# Patient Record
Sex: MALE | Race: WHITE | NOT HISPANIC OR LATINO | URBAN - METROPOLITAN AREA
[De-identification: names, ages, dates, MRNs, and addresses within clinical notes are randomized per-mention and may not be internally consistent; named-entity substitution may affect disease eponyms.]

---

## 2020-09-15 ENCOUNTER — TELEPHONE (OUTPATIENT)
Dept: FAMILY MEDICINE CLINIC | Facility: CLINIC | Age: 60
End: 2020-09-15

## 2020-09-15 NOTE — TELEPHONE ENCOUNTER
Patient called to see if you would consider taking him and his family, Ananya 09/27/1963 and son Robbie 03/16/2000 as new patients.  He was seeing Dr Raphael Reddy, but he is now retiring.  Tim was a patient of yours at the practice in [place].  Thanks

## 2021-01-06 ENCOUNTER — TELEPHONE (OUTPATIENT)
Dept: FAMILY MEDICINE CLINIC | Facility: CLINIC | Age: 61
End: 2021-01-06

## 2021-01-06 NOTE — TELEPHONE ENCOUNTER
Pt is a new patient here in this office, will be seeing  tomorrow. Will wait for fax to come in for PA

## 2021-01-06 NOTE — TELEPHONE ENCOUNTER
ISAIAH FROM [facility] CALLED LEONEL ND STATED HE IS FAXING A PRIOR AUTHORIZATION OVER BY FAX     SOFTWARE COVER MY MEDS    CODE [id number]     CALL IN INFORMATION TO [facility] CLINICAL CALL REVIEW   [phone]   PROVIDE INFORMATION BYSTOLIC 5MG 30 DAY SUPPLY 30 PILLS     REFERENCE [id number]

## 2021-01-07 ENCOUNTER — OFFICE VISIT (OUTPATIENT)
Dept: FAMILY MEDICINE CLINIC | Facility: CLINIC | Age: 61
End: 2021-01-07

## 2021-01-07 VITALS
BODY MASS INDEX: 26.07 KG/M2 | HEIGHT: 68 IN | OXYGEN SATURATION: 98 % | HEART RATE: 74 BPM | SYSTOLIC BLOOD PRESSURE: 134 MMHG | WEIGHT: 172 LBS | DIASTOLIC BLOOD PRESSURE: 80 MMHG

## 2021-01-07 DIAGNOSIS — I10 ESSENTIAL HYPERTENSION: Primary | ICD-10-CM

## 2021-01-07 DIAGNOSIS — Z12.5 SCREENING FOR PROSTATE CANCER: ICD-10-CM

## 2021-01-07 PROBLEM — Z00.00 ROUTINE ADULT HEALTH MAINTENANCE: Status: ACTIVE | Noted: 2021-01-07

## 2021-01-07 PROBLEM — K51.00 ULCERATIVE PANCOLITIS WITHOUT COMPLICATION: Status: ACTIVE | Noted: 2021-01-07

## 2021-01-07 PROCEDURE — 99203 OFFICE O/P NEW LOW 30 MIN: CPT | Performed by: FAMILY MEDICINE

## 2021-01-07 RX ORDER — PANTOPRAZOLE SODIUM 20 MG/1
TABLET, DELAYED RELEASE ORAL
COMMUNITY
Start: 2020-10-03

## 2021-01-07 RX ORDER — MULTIPLE VITAMINS W/ MINERALS TAB 9MG-400MCG
1 TAB ORAL DAILY
COMMUNITY
End: 2022-05-20

## 2021-01-07 RX ORDER — NEBIVOLOL HYDROCHLORIDE 5 MG/1
5 TABLET ORAL
COMMUNITY
Start: 2021-01-02 | End: 2021-01-27 | Stop reason: SDUPTHER

## 2021-01-07 NOTE — PROGRESS NOTES
"  Chief Complaint   Patient presents with   • Hypertension   • Establish Care     Patient called to see if you would consider taking him and his family, Ananya 09/27/1963 and son Robbie 03/16/2000 as new patients.  He was seeing Dr Raphael Reddy, but he is now retiring.  Tim was a patient of yours at the practice in [place].  Thanks  Subjective     Patient here for follow-up of elevated blood pressure.    He is exercising and is adherent to a low-salt diet.    Blood pressure is well controlled at home.   Cardiac symptoms: none.   Patient denies: chest pressure/discomfort, claudication, cough, dyspnea, exertional chest pressure/discomfort, fatigue, irregular heart beat, lower extremity edema, near-syncope, orthopnea, palpitations, paroxysmal nocturnal dyspnea, syncope, tachypnea and weight gain.   Cardiovascular risk factors: hypertension and male gender.   Use of agents associated with hypertension: none.   History of target organ damage: none.  Patient is taking prescribed hypertension medications as prescribed without side effects.  Had failed prior beta blockers. But Bystolic working well for year.        The following portions of the patient's history were reviewed and updated as appropriate: allergies, current medications, past family history, past medical history, past social history, past surgical history and problem list.    Review of Systems  Pertinent items are noted in HPI.    No results found for this or any previous visit.     Vitals:    01/07/21 0958   BP: 134/80   BP Location: Left arm   Patient Position: Sitting   Cuff Size: Adult   Pulse: 74   SpO2: 98%   Weight: 78 kg (172 lb)   Height: 172.7 cm (68\")     BP Readings from Last 3 Encounters:   01/07/21 134/80     Objective      Gen: alert, pleasant.  Neck: no bruit, no enlarged thyroid  Lungs: CTA  Heart: RR, no murmur  Feet: no edema  Pulses: intact  Mood: stable     Assessment/Plan   Hypertension, normal blood pressure Evidence of target " organ damage: none.    Diagnoses and all orders for this visit:    1. Essential hypertension (Primary)  -     CBC & Differential; Future  -     Comprehensive Metabolic Panel; Future  -     Lipid Panel; Future  -     PSA Screen; Future  -     TSH; Future  -     Urinalysis With Microscopic If Indicated (No Culture) - Urine, Clean Catch; Future    2. Screening for prostate cancer  -     PSA Screen; Future    UC is stable.    Medication: no change.  Follow up: 5 months and as needed.    There are no Patient Instructions on file for this visit.  There are no discontinued medications.     Return in about 4 months (around 5/7/2021) for Annual physical, blood pressure.    Limit salt  Limit alcoholic drinks to 1 a day  Limit caffeine to 1-2 servings a day    Dr. Reid Davenport MD  [place], [place]  [facility].

## 2021-01-27 ENCOUNTER — TELEPHONE (OUTPATIENT)
Dept: FAMILY MEDICINE CLINIC | Facility: CLINIC | Age: 61
End: 2021-01-27

## 2021-01-27 DIAGNOSIS — I10 ESSENTIAL HYPERTENSION: Primary | ICD-10-CM

## 2021-01-27 RX ORDER — NEBIVOLOL HYDROCHLORIDE 5 MG/1
5 TABLET ORAL
Qty: 30 TABLET | Refills: 11 | Status: SHIPPED | OUTPATIENT
Start: 2021-01-27 | End: 2022-01-11 | Stop reason: SDUPTHER

## 2021-01-27 NOTE — TELEPHONE ENCOUNTER
Caller: Tim iN    Relationship: Self    Best call back number: [phone]    Medication needed:   Requested Prescriptions     Pending Prescriptions Disp Refills   • Bystolic 5 MG tablet 30 tablet      Sig: Take 1 tablet by mouth every night at bedtime.       When do you need the refill by: 02/01/21    What details did the patient provide when requesting the medication: THE PATIENT STATED THAT HE HAD A PRIOR AUTHORIZATION FORM FOR THIS MEDICATION SENT OVER FOR THE MEDICATION FROM [facility] TO THE OFFICE AND WOULD LIKE TO CHECK IF THAT FORM WAS FAXED BACK TO [facility]     Does the patient have less than a 3 day supply:  [] Yes  [x] No    What is the patient's preferred pharmacy: [facility] #[id number] [place] [address] [address] AT [address] [phone] [facility] [phone] FX

## 2021-01-27 NOTE — TELEPHONE ENCOUNTER
Medication has been approved, pt notified. Needing new script called in to pharmacy.     Ok thanks    done

## 2021-05-06 DIAGNOSIS — R53.83 FATIGUE, UNSPECIFIED TYPE: Primary | ICD-10-CM

## 2021-05-07 DIAGNOSIS — R53.83 FATIGUE, UNSPECIFIED TYPE: ICD-10-CM

## 2021-05-14 ENCOUNTER — OFFICE VISIT (OUTPATIENT)
Dept: FAMILY MEDICINE CLINIC | Facility: CLINIC | Age: 61
End: 2021-05-14

## 2021-05-14 VITALS
HEIGHT: 68 IN | SYSTOLIC BLOOD PRESSURE: 120 MMHG | HEART RATE: 81 BPM | TEMPERATURE: 97.3 F | BODY MASS INDEX: 26.67 KG/M2 | OXYGEN SATURATION: 99 % | DIASTOLIC BLOOD PRESSURE: 78 MMHG | WEIGHT: 176 LBS

## 2021-05-14 DIAGNOSIS — I10 ESSENTIAL HYPERTENSION: ICD-10-CM

## 2021-05-14 DIAGNOSIS — Z12.5 SCREENING FOR PROSTATE CANCER: ICD-10-CM

## 2021-05-14 DIAGNOSIS — K51.00 ULCERATIVE PANCOLITIS WITHOUT COMPLICATION (HCC): ICD-10-CM

## 2021-05-14 DIAGNOSIS — Z00.00 ROUTINE ADULT HEALTH MAINTENANCE: Primary | ICD-10-CM

## 2021-05-14 DIAGNOSIS — R79.89 ELEVATED TSH: ICD-10-CM

## 2021-05-14 PROCEDURE — 99396 PREV VISIT EST AGE 40-64: CPT | Performed by: FAMILY MEDICINE

## 2021-05-14 RX ORDER — ZOSTER VACCINE RECOMBINANT, ADJUVANTED 50 MCG/0.5
50 KIT INTRAMUSCULAR
Qty: 1 EACH | Refills: 1 | Status: SHIPPED | OUTPATIENT
Start: 2021-05-14 | End: 2021-11-11

## 2021-05-14 NOTE — PROGRESS NOTES
"  Chief Complaint   Patient presents with   • Annual Exam       Subjective   Tim Ni is a 60 y.o. male and is here for a yearly physical exam. The patient reports problems - recent flair of his UC.    Do you take any herbs or supplements that were not prescribed by a doctor? yes. If so, these will be added to active medication list.    The following portions of the patient's history were reviewed and updated as appropriate: allergies, current medications, past family history, past medical history, past social history, past surgical history and problem list.    Social and Family and Surgical History reviewed and updated today, see Rooming tab.    Health History, Preventive Measures and Vaccination flow sheets reviewed and updated today.    Patient's current medical chart in Epic; including previous office notes, Mychart messages, recent phone calls, imaging, labs, specialist's evaluation either in notes or in Media tab reviewed today.    Other outside pertinent medical information also reviewed thru Care Everywhere function is also reviewed today.    Review of Systems  Review of Systems  A comprehensive review of systems was negative except for: Gastrointestinal: positive for constipation and diarrhea    Vitals:    05/14/21 1023   BP: 120/78   BP Location: Left arm   Patient Position: Sitting   Cuff Size: Adult   Pulse: 81   Temp: 97.3 °F (36.3 °C)   TempSrc: Temporal   SpO2: 99%   Weight: 79.8 kg (176 lb)   Height: 172.7 cm (68\")       General Appearance:  Alert, cooperative, no distress, appears stated age   Head:  Normocephalic, without obvious abnormality, atraumatic   Eyes:  PERRL, conjunctiva/corneas clear, EOM's intact.   Ears:  Normal TM's and external ear canals, both ears   Nose: Nares normal, septum midline, mucosa normal, no drainage or sinus tenderness   Throat: Lips, mucosa, and tongue normal; teeth and gums normal   Neck: Supple, symmetrical, trachea midline, no adenopathy;   thyroid: No " enlargement/tenderness/nodules; no carotid  bruit   Back:  Symmetric, no curvature, ROM normal, no CVA tenderness   Lungs:  Clear to auscultation bilaterally, respirations unlabored   Chest wall:  No tenderness or deformity   Heart:  Regular rate and rhythm, S1 and S2 normal, no murmur, rub or gallop   Abdomen:  Soft, non-tender, bowel sounds active all four quadrants,   no masses, no organomegaly   Rectal:        Extremities: Extremities normal, atraumatic, no cyanosis or edema   Pulses: 2+ and symmetric all extremities   Skin: Skin color, texture, turgor normal, no rashes or lesions   Lymph nodes: Cervical, supraclavicular, and axillary nodes normal   Neurologic: CNII-XII intact. Normal strength, sensation and reflexes   throughout          Results for orders placed or performed in visit on 05/07/21   Comprehensive Metabolic Panel    Specimen: Blood   Result Value Ref Range    Glucose 97 65 - 99 mg/dL    BUN 16 8 - 23 mg/dL    Creatinine 1.21 0.76 - 1.27 mg/dL    eGFR Non African Am 61 >60 mL/min/1.73    eGFR African Am 74 >60 mL/min/1.73    BUN/Creatinine Ratio 13.2 7.0 - 25.0    Sodium 140 136 - 145 mmol/L    Potassium 4.5 3.5 - 5.2 mmol/L    Chloride 101 98 - 107 mmol/L    Total CO2 30.9 (H) 22.0 - 29.0 mmol/L    Calcium 9.4 8.6 - 10.5 mg/dL    Total Protein 7.4 6.0 - 8.5 g/dL    Albumin 4.40 3.50 - 5.20 g/dL    Globulin 3.0 gm/dL    A/G Ratio 1.5 g/dL    Total Bilirubin 0.5 0.0 - 1.2 mg/dL    Alkaline Phosphatase 103 39 - 117 U/L    AST (SGOT) 21 1 - 40 U/L    ALT (SGPT) 18 1 - 41 U/L   Lipid Panel    Specimen: Blood   Result Value Ref Range    Total Cholesterol 172 0 - 200 mg/dL    Triglycerides 100 0 - 150 mg/dL    HDL Cholesterol 42 40 - 60 mg/dL    VLDL Cholesterol Jose 18 5 - 40 mg/dL    LDL Chol Calc (NIH) 112 (H) 0 - 100 mg/dL   PSA Screen    Specimen: Blood   Result Value Ref Range    PSA 1.340 0.000 - 4.000 ng/mL   TSH    Specimen: Blood   Result Value Ref Range    TSH 5.920 (H) 0.270 - 4.200 uIU/mL    Urinalysis With Microscopic If Indicated (No Culture) - Urine, Clean Catch    Specimen: Urine, Clean Catch   Result Value Ref Range    Specific Gravity, UA Comment 1.005 - 1.030    pH, UA 5.5 5.0 - 8.0    Color, UA Yellow     Appearance, UA Clear Clear    Leukocytes, UA Negative Negative    Protein Negative Negative    Glucose, UA Negative Negative    Ketones Negative Negative    Blood, UA Negative Negative    Bilirubin, UA Negative Negative    Urobilinogen, UA Comment     Nitrite, UA Negative Negative   Vitamin B12   Result Value Ref Range    Vitamin B-12 740 211 - 946 pg/mL   CBC & Differential    Specimen: Blood   Result Value Ref Range    WBC 7.14 3.40 - 10.80 10*3/mm3    RBC 4.69 4.14 - 5.80 10*6/mm3    Hemoglobin 13.7 13.0 - 17.7 g/dL    Hematocrit 40.3 37.5 - 51.0 %    MCV 85.9 79.0 - 97.0 fL    MCH 29.2 26.6 - 33.0 pg    MCHC 34.0 31.5 - 35.7 g/dL    RDW 12.9 12.3 - 15.4 %    Platelets 255 140 - 450 10*3/mm3    Neutrophil Rel % 68.0 42.7 - 76.0 %    Lymphocyte Rel % 20.9 19.6 - 45.3 %    Monocyte Rel % 7.1 5.0 - 12.0 %    Eosinophil Rel % 2.1 0.3 - 6.2 %    Basophil Rel % 1.5 0.0 - 1.5 %    Neutrophils Absolute 4.85 1.70 - 7.00 10*3/mm3    Lymphocytes Absolute 1.49 0.70 - 3.10 10*3/mm3    Monocytes Absolute 0.51 0.10 - 0.90 10*3/mm3    Eosinophils Absolute 0.15 0.00 - 0.40 10*3/mm3    Basophils Absolute 0.11 0.00 - 0.20 10*3/mm3    Immature Granulocyte Rel % 0.4 0.0 - 0.5 %    Immature Grans Absolute 0.03 0.00 - 0.05 10*3/mm3    nRBC 0.0 0.0 - 0.2 /100 WBC     Assessment/Plan   Healthy male exam.  Diagnoses and all orders for this visit:    1. Routine adult health maintenance (Primary)  -     CBC & Differential; Future  -     Comprehensive Metabolic Panel; Future  -     Lipid Panel; Future  -     TSH; Future  -     T4, Free; Future  -     T3; Future  -     Urinalysis With Microscopic If Indicated (No Culture) - Urine, Clean Catch; Future  -     PSA Screen; Future    2. Essential hypertension  -     CBC &  Differential; Future  -     Comprehensive Metabolic Panel; Future  -     Lipid Panel; Future  -     Urinalysis With Microscopic If Indicated (No Culture) - Urine, Clean Catch; Future    3. Ulcerative pancolitis without complication (CMS/HCC)    4. Screening for prostate cancer  -     PSA Screen; Future    5. Elevated TSH  -     TSH; Future  -     T4, Free; Future  -     T3; Future    Other orders  -     Zoster Vac Recomb Adjuvanted (Shingrix) 50 MCG/0.5ML reconstituted suspension; Inject 0.5 mL into the appropriate muscle as directed by prescriber Every 6 (Six) Months for 2 doses.  Dispense: 1 each; Refill: 1      1. Labs reviewed  TSH up some  2. Patient Counseling:  --Nutrition: Stressed importance of moderation in sodium/caffeine intake, saturated fat and cholesterol.  Discussed caloric balance, sufficient intake of fresh fruits, vegetables, fiber, calcium, iron.  --Exercise: Stressed the importance of regular exercise.   --Substance Abuse: Discussed cessation/primary prevention of tobacco, alcohol, or other drug use; driving or other dangerous activities under the influence.    --Dental health: Discussed importance of regular tooth brushing, flossing, and dental visits.  --Suggested having eyes and vision checked if needed or past due.  --Immunizations reviewed and given if needed.  --Discussed benefits of screening colonoscopy and or ColoGuard.  3. Discussed the patient's BMI with him.  The BMI is above average; BMI management plan is completed  4. Follow up in one year    There are no Patient Instructions on file for this visit.    There are no discontinued medications.     Dr. Reid Davenport MD  [place], [place]  [facility]

## 2021-10-06 ENCOUNTER — TELEPHONE (OUTPATIENT)
Dept: FAMILY MEDICINE CLINIC | Facility: CLINIC | Age: 61
End: 2021-10-06

## 2021-10-06 NOTE — TELEPHONE ENCOUNTER
PATIENT CALLED FOR A 90 DAY REFILL ON HIS NEXT REFILL OF    Bystolic 5 MG tablet    HE WOULD LIKE TO HAVE 90 DAY SUPPLY AT NEXT REFILL. HE JUST PICKED UP THIS MONTHS REFILL    [facility] #[id number] - [place], [place] - [address] AT [facility] OF [place] [address] [address] - [phone]  - [phone]   [phone]    CALL BACK NUMBER [phone]

## 2022-01-11 DIAGNOSIS — I10 ESSENTIAL HYPERTENSION: ICD-10-CM

## 2022-01-11 RX ORDER — NEBIVOLOL HYDROCHLORIDE 5 MG/1
5 TABLET ORAL
Qty: 30 TABLET | Refills: 11 | Status: SHIPPED | OUTPATIENT
Start: 2022-01-11 | End: 2022-12-26

## 2022-01-11 NOTE — TELEPHONE ENCOUNTER
Caller: Tim Ni    Relationship: Self    Best call back number: [phone]    Requested Prescriptions:   Requested Prescriptions     Pending Prescriptions Disp Refills   • Bystolic 5 MG tablet 30 tablet 11     Sig: Take 1 tablet by mouth every night at bedtime.        Pharmacy where request should be sent:  [facility] #[id number] [address] AT [address] [phone] [facility] [phone]         Additional details provided by patient: PATIENT IS STATING HIS INSURANCE IS REQUIRING A PRIOR AUTHORIZATION ON THIS MEDICATION AGAIN. PLEASE APPROVE AND REFILL. PATIENT CAN NOT TAKE THE GENERIC.     Does the patient have less than a 3 day supply:  [x] Yes  [] No    Rosana Son, Vel Rep   01/11/22 10:37 EST

## 2022-05-20 ENCOUNTER — OFFICE VISIT (OUTPATIENT)
Dept: FAMILY MEDICINE CLINIC | Facility: CLINIC | Age: 62
End: 2022-05-20

## 2022-05-20 VITALS
SYSTOLIC BLOOD PRESSURE: 130 MMHG | BODY MASS INDEX: 26.37 KG/M2 | HEART RATE: 73 BPM | OXYGEN SATURATION: 95 % | TEMPERATURE: 97.7 F | HEIGHT: 68 IN | WEIGHT: 174 LBS | DIASTOLIC BLOOD PRESSURE: 80 MMHG

## 2022-05-20 DIAGNOSIS — Z00.00 ROUTINE ADULT HEALTH MAINTENANCE: Primary | ICD-10-CM

## 2022-05-20 DIAGNOSIS — I10 ESSENTIAL HYPERTENSION: ICD-10-CM

## 2022-05-20 DIAGNOSIS — E03.9 ACQUIRED HYPOTHYROIDISM: ICD-10-CM

## 2022-05-20 DIAGNOSIS — Z12.5 SCREENING FOR PROSTATE CANCER: ICD-10-CM

## 2022-05-20 PROCEDURE — 99396 PREV VISIT EST AGE 40-64: CPT | Performed by: FAMILY MEDICINE

## 2022-05-20 RX ORDER — LEVOTHYROXINE SODIUM 0.03 MG/1
25 TABLET ORAL
Qty: 90 TABLET | Refills: 1 | Status: SHIPPED | OUTPATIENT
Start: 2022-05-20

## 2022-05-20 NOTE — PROGRESS NOTES
"  Chief Complaint   Patient presents with   • Annual Exam       Subjective   Tim Ni is a 62 y.o. male and is here for a yearly physical exam. The patient reports no problems.    Do you take any herbs or supplements that were not prescribed by a doctor? yes. If so, these will be added to active medication list.    The following portions of the patient's history were reviewed and updated as appropriate: allergies, current medications, past family history, past medical history, past social history, past surgical history and problem list.    Social and Family and Surgical History reviewed and updated today, see Rooming tab.    Health History, Preventive Measures and Vaccination flow sheets reviewed and updated today.    Patient's current medical chart in Epic; including previous office notes, Mychart messages, recent phone calls, imaging, labs, specialist's evaluation either in notes or in Media tab reviewed today.    Other outside pertinent medical information also reviewed thru Care Everywhere function is also reviewed today.    Review of Systems  Review of Systems  A comprehensive review of systems was negative except for: Gastrointestinal: positive for change in bowel habits, constipation, diarrhea and dyspepsia    Vitals:    05/20/22 0952   BP: 130/80   BP Location: Left arm   Patient Position: Sitting   Cuff Size: Adult   Pulse: 73   Temp: 97.7 °F (36.5 °C)   SpO2: 95%   Weight: 78.9 kg (174 lb)   Height: 172.7 cm (68\")     Body mass index is 26.46 kg/m².      General Appearance:  Alert, cooperative, no distress, appears stated age   Head:  Normocephalic, without obvious abnormality, atraumatic   Eyes:  PERRL, conjunctiva/corneas clear, EOM's intact.   Ears:  Normal TM's and external ear canals, both ears   Nose: Nares normal, septum midline, mucosa normal, no drainage or sinus tenderness   Throat: Lips, mucosa, and tongue normal; teeth and gums normal   Neck: Supple, symmetrical, trachea midline, no " adenopathy;   thyroid: No enlargement/tenderness/nodules; no carotid  bruit   Back:  Symmetric, no curvature, ROM normal, no CVA tenderness   Lungs:  Clear to auscultation bilaterally, respirations unlabored   Chest wall:  No tenderness or deformity   Heart:  Regular rate and rhythm, S1 and S2 normal, no murmur, rub or gallop   Abdomen:  Soft, non-tender, bowel sounds active all four quadrants,   no masses, no organomegaly   Rectal:        Extremities: Extremities normal, atraumatic, no cyanosis or edema   Pulses: 2+ and symmetric all extremities   Skin: Skin color, texture, turgor normal, no rashes or lesions   Lymph nodes: Cervical, supraclavicular, and axillary nodes normal   Neurologic: CNII-XII intact. Normal strength, sensation and reflexes   throughout          Results for orders placed or performed in visit on 05/02/22   Comprehensive Metabolic Panel    Specimen: Blood   Result Value Ref Range    Glucose 97 65 - 99 mg/dL    BUN 13 8 - 27 mg/dL    Creatinine 1.11 0.76 - 1.27 mg/dL    EGFR Result 76 >59 mL/min/1.73    BUN/Creatinine Ratio 12 10 - 24    Sodium 143 134 - 144 mmol/L    Potassium 4.5 3.5 - 5.2 mmol/L    Chloride 102 96 - 106 mmol/L    Total CO2 27 20 - 29 mmol/L    Calcium 9.3 8.6 - 10.2 mg/dL    Total Protein 7.4 6.0 - 8.5 g/dL    Albumin 4.1 3.8 - 4.8 g/dL    Globulin 3.3 1.5 - 4.5 g/dL    A/G Ratio 1.2 1.2 - 2.2    Total Bilirubin 0.4 0.0 - 1.2 mg/dL    Alkaline Phosphatase 98 44 - 121 IU/L    AST (SGOT) 19 0 - 40 IU/L    ALT (SGPT) 18 0 - 44 IU/L   Lipid Panel    Specimen: Blood   Result Value Ref Range    Total Cholesterol 160 100 - 199 mg/dL    Triglycerides 122 0 - 149 mg/dL    HDL Cholesterol 39 (L) >39 mg/dL    VLDL Cholesterol Jose 22 5 - 40 mg/dL    LDL Chol Calc (NIH) 99 0 - 99 mg/dL   TSH    Specimen: Blood   Result Value Ref Range    TSH 5.960 (H) 0.450 - 4.500 uIU/mL   T4, Free    Specimen: Blood   Result Value Ref Range    Free T4 0.74 (L) 0.82 - 1.77 ng/dL   T3    Specimen: Blood    Result Value Ref Range    T3, Total 127 71 - 180 ng/dL   Urinalysis With Microscopic If Indicated (No Culture) - Urine, Clean Catch    Specimen: Urine, Clean Catch   Result Value Ref Range    Specific Gravity, UA 1.021 1.005 - 1.030    pH, UA 5.5 5.0 - 7.5    Color, UA Yellow Yellow    Appearance, UA Clear Clear    Leukocytes, UA Negative Negative    Protein Trace Negative/Trace    Glucose, UA Negative Negative    Ketones Negative Negative    Blood, UA Negative Negative    Bilirubin, UA Negative Negative    Urobilinogen, UA 0.2 0.2 - 1.0 mg/dL    Nitrite, UA Negative Negative    Microscopic Examination Comment    PSA Screen    Specimen: Blood   Result Value Ref Range    PSA 1.2 0.0 - 4.0 ng/mL   CBC & Differential    Specimen: Blood   Result Value Ref Range    WBC 6.1 3.4 - 10.8 x10E3/uL    RBC 4.81 4.14 - 5.80 x10E6/uL    Hemoglobin 13.9 13.0 - 17.7 g/dL    Hematocrit 42.6 37.5 - 51.0 %    MCV 89 79 - 97 fL    MCH 28.9 26.6 - 33.0 pg    MCHC 32.6 31.5 - 35.7 g/dL    RDW 13.3 11.6 - 15.4 %    Platelets 259 150 - 450 x10E3/uL    Neutrophil Rel % 61 Not Estab. %    Lymphocyte Rel % 22 Not Estab. %    Monocyte Rel % 12 Not Estab. %    Eosinophil Rel % 3 Not Estab. %    Basophil Rel % 2 Not Estab. %    Neutrophils Absolute 3.8 1.4 - 7.0 x10E3/uL    Lymphocytes Absolute 1.4 0.7 - 3.1 x10E3/uL    Monocytes Absolute 0.7 0.1 - 0.9 x10E3/uL    Eosinophils Absolute 0.2 0.0 - 0.4 x10E3/uL    Basophils Absolute 0.1 0.0 - 0.2 x10E3/uL    Immature Granulocyte Rel % 0 Not Estab. %    Immature Grans Absolute 0.0 0.0 - 0.1 x10E3/uL     Assessment & Plan   Healthy male exam.  Diagnoses and all orders for this visit:    1. Routine adult health maintenance (Primary)    2. Screening for prostate cancer    3. Essential hypertension    4. Acquired hypothyroidism  -     levothyroxine (Synthroid) 25 MCG tablet; Take 1 tablet by mouth Every Morning. Indications: Underactive Thyroid  Dispense: 90 tablet; Refill: 1  -     TSH; Future  -      T4, Free; Future  -     T3; Future  -     Thyroid Antibodies; Future      1. Had repeat Cscope. Active UC  New onset hypothyroid    2. Patient Counseling:  --Nutrition: Stressed importance of moderation in sodium/caffeine intake, saturated fat and cholesterol.  Discussed caloric balance, sufficient intake of fresh fruits, vegetables, fiber, calcium, iron.  --Exercise: Stressed the importance of regular exercise.   --Substance Abuse: Discussed cessation/primary prevention of tobacco, alcohol, or other drug use; driving or other dangerous activities under the influence.    --Dental health: Discussed importance of regular tooth brushing, flossing, and dental visits.  --Suggested having eyes and vision checked if needed or past due.  --Immunizations reviewed and given if needed.  --Discussed benefits of screening colonoscopy and or ColoGuard.  3. Discussed the patient's BMI with him.  The BMI is above average; BMI management plan is completed  4. Follow up in 6 months    There are no Patient Instructions on file for this visit.    Medications Discontinued During This Encounter   Medication Reason   • multivitamin with minerals tablet tablet *Therapy completed        Dr. Reid Davenport MD  [place], [place]  [facility]

## 2022-08-18 DIAGNOSIS — E03.9 ACQUIRED HYPOTHYROIDISM: ICD-10-CM

## 2022-08-18 RX ORDER — LEVOTHYROXINE SODIUM 0.03 MG/1
TABLET ORAL
Qty: 90 TABLET | Refills: 1 | OUTPATIENT
Start: 2022-08-18

## 2022-12-24 DIAGNOSIS — I10 ESSENTIAL HYPERTENSION: ICD-10-CM

## 2022-12-26 RX ORDER — NEBIVOLOL HYDROCHLORIDE 5 MG/1
5 TABLET ORAL
Qty: 90 TABLET | Refills: 0 | Status: SHIPPED | OUTPATIENT
Start: 2022-12-26 | End: 2023-03-12

## 2023-03-12 DIAGNOSIS — I10 ESSENTIAL HYPERTENSION: ICD-10-CM

## 2023-03-12 RX ORDER — NEBIVOLOL HYDROCHLORIDE 5 MG/1
5 TABLET ORAL
Qty: 90 TABLET | Refills: 0 | Status: SHIPPED | OUTPATIENT
Start: 2023-03-12

## 2023-03-13 ENCOUNTER — TELEPHONE (OUTPATIENT)
Dept: FAMILY MEDICINE CLINIC | Facility: CLINIC | Age: 63
End: 2023-03-13
Payer: COMMERCIAL

## 2023-03-13 NOTE — TELEPHONE ENCOUNTER
Received fax notification that the Bystolic is not covered by the patient's insurance plan.  Suggest atenolol or Toprol or Coreg.    Inform patient of his insurances rules, would suggest switching over to Toprol

## 2023-05-19 DIAGNOSIS — E03.9 ACQUIRED HYPOTHYROIDISM: ICD-10-CM

## 2023-05-19 DIAGNOSIS — Z12.5 SCREENING FOR PROSTATE CANCER: ICD-10-CM

## 2023-05-19 DIAGNOSIS — Z00.00 ROUTINE ADULT HEALTH MAINTENANCE: Primary | ICD-10-CM

## 2023-05-20 LAB
ALBUMIN SERPL-MCNC: 3.6 G/DL (ref 3.8–4.8)
ALBUMIN/GLOB SERPL: 1.1 {RATIO} (ref 1.2–2.2)
ALP SERPL-CCNC: 80 IU/L (ref 44–121)
ALT SERPL-CCNC: 11 IU/L (ref 0–44)
AST SERPL-CCNC: 19 IU/L (ref 0–40)
BILIRUB SERPL-MCNC: 0.3 MG/DL (ref 0–1.2)
BUN SERPL-MCNC: 8 MG/DL (ref 8–27)
BUN/CREAT SERPL: 7 (ref 10–24)
CALCIUM SERPL-MCNC: 9 MG/DL (ref 8.6–10.2)
CHLORIDE SERPL-SCNC: 102 MMOL/L (ref 96–106)
CHOLEST SERPL-MCNC: 142 MG/DL (ref 100–199)
CO2 SERPL-SCNC: 27 MMOL/L (ref 20–29)
CREAT SERPL-MCNC: 1.18 MG/DL (ref 0.76–1.27)
EGFRCR SERPLBLD CKD-EPI 2021: 69 ML/MIN/1.73
ERYTHROCYTE [DISTWIDTH] IN BLOOD BY AUTOMATED COUNT: 12.3 % (ref 11.6–15.4)
GLOBULIN SER CALC-MCNC: 3.2 G/DL (ref 1.5–4.5)
GLUCOSE SERPL-MCNC: 102 MG/DL (ref 70–99)
GLUCOSE UR QL STRIP: NORMAL
HCT VFR BLD AUTO: 38.6 % (ref 37.5–51)
HDLC SERPL-MCNC: 33 MG/DL
HGB BLD-MCNC: 12.1 G/DL (ref 13–17.7)
KETONES UR QL STRIP: NORMAL
LDLC SERPL CALC-MCNC: 90 MG/DL (ref 0–99)
MCH RBC QN AUTO: 27.1 PG (ref 26.6–33)
MCHC RBC AUTO-ENTMCNC: 31.3 G/DL (ref 31.5–35.7)
MCV RBC AUTO: 87 FL (ref 79–97)
PH UR STRIP: NORMAL [PH]
PLATELET # BLD AUTO: 434 X10E3/UL (ref 150–450)
POTASSIUM SERPL-SCNC: 3.9 MMOL/L (ref 3.5–5.2)
PROT SERPL-MCNC: 6.8 G/DL (ref 6–8.5)
PROT UR QL STRIP: NORMAL
PSA SERPL-MCNC: 1.5 NG/ML (ref 0–4)
RBC # BLD AUTO: 4.46 X10E6/UL (ref 4.14–5.8)
SODIUM SERPL-SCNC: 145 MMOL/L (ref 134–144)
SP GR UR STRIP: NORMAL
SPECIMEN STATUS: NORMAL
T3FREE SERPL-MCNC: 2.4 PG/ML (ref 2–4.4)
T4 FREE SERPL-MCNC: 0.88 NG/DL (ref 0.82–1.77)
TRIGL SERPL-MCNC: 102 MG/DL (ref 0–149)
TSH SERPL DL<=0.005 MIU/L-ACNC: 3.01 UIU/ML (ref 0.45–4.5)
VLDLC SERPL CALC-MCNC: 19 MG/DL (ref 5–40)
WBC # BLD AUTO: 8.6 X10E3/UL (ref 3.4–10.8)

## 2023-05-26 ENCOUNTER — OFFICE VISIT (OUTPATIENT)
Dept: FAMILY MEDICINE CLINIC | Facility: CLINIC | Age: 63
End: 2023-05-26
Payer: COMMERCIAL

## 2023-05-26 VITALS
WEIGHT: 157 LBS | TEMPERATURE: 97.8 F | BODY MASS INDEX: 23.79 KG/M2 | SYSTOLIC BLOOD PRESSURE: 108 MMHG | HEIGHT: 68 IN | DIASTOLIC BLOOD PRESSURE: 70 MMHG | OXYGEN SATURATION: 97 % | HEART RATE: 81 BPM

## 2023-05-26 DIAGNOSIS — Z12.5 SCREENING FOR PROSTATE CANCER: ICD-10-CM

## 2023-05-26 DIAGNOSIS — Z00.00 ROUTINE ADULT HEALTH MAINTENANCE: Primary | ICD-10-CM

## 2023-05-26 DIAGNOSIS — K51.00 ULCERATIVE PANCOLITIS WITHOUT COMPLICATION: ICD-10-CM

## 2023-05-26 PROBLEM — K21.9 GASTRO-ESOPHAGEAL REFLUX DISEASE WITHOUT ESOPHAGITIS: Status: ACTIVE | Noted: 2023-05-26

## 2023-05-26 PROBLEM — K22.2 ESOPHAGEAL STRICTURE: Status: ACTIVE | Noted: 2023-05-26

## 2023-05-26 PROCEDURE — 99396 PREV VISIT EST AGE 40-64: CPT | Performed by: FAMILY MEDICINE

## 2023-05-26 RX ORDER — BALSALAZIDE DISODIUM 750 MG/1
CAPSULE ORAL
COMMUNITY
Start: 2023-05-11 | End: 2023-05-26 | Stop reason: SINTOL

## 2023-05-26 RX ORDER — BUDESONIDE 3 MG/1
6 CAPSULE, COATED PELLETS ORAL EVERY MORNING
Qty: 30 CAPSULE | Refills: 0 | Status: SHIPPED | OUTPATIENT
Start: 2023-05-26

## 2023-05-26 NOTE — PROGRESS NOTES
"  Chief Complaint   Patient presents with   • Annual Exam   • Diarrhea   • Fatigue     Quality of life has changed.   • Night Sweats   • Fever       Subjective   Tim Ni is a 63 y.o. male and is here for a yearly physical exam. The patient reports problems - Having an active ulcerative colitis flareup.  Which includes nausea vomiting and not want to eat.  Loose stools weight loss fever and chills.  Recently placed on: Colazal from GI.  Medicines seem to make it worse.  Encouraged Mr. Ni to call his GI specialist for a follow-up..    Do you take any herbs or supplements that were not prescribed by a doctor? no. If so, these will be added to active medication list.    The following portions of the patient's history were reviewed and updated as appropriate: allergies, current medications, past family history, past medical history, past social history, past surgical history and problem list.    Social and Family and Surgical History reviewed and updated today, see Rooming tab.    Health History, Preventive Measures and Vaccination flow sheets reviewed and updated today.    Patient's current medical chart in Epic; including previous office notes, Mychart messages, recent phone calls, imaging, labs, specialist's evaluation either in notes or in Media tab reviewed today.    Other outside pertinent medical information also reviewed thru Care Everywhere function is also reviewed today.    Review of Systems  Review of Systems  A comprehensive review of systems was negative except for: Gastrointestinal: positive for abdominal pain, change in bowel habits, diarrhea, dyspepsia and vomiting    Vitals:    05/26/23 1113   BP: 108/70   Pulse: 81   Temp: 97.8 °F (36.6 °C)   SpO2: 97%   Weight: 71.2 kg (157 lb)   Height: 172.7 cm (67.99\")     Body mass index is 23.88 kg/m².      General Appearance:  Alert, cooperative, no distress, appears stated age   Head:  Normocephalic, without obvious abnormality, atraumatic   Eyes:  " PERRL, conjunctiva/corneas clear, EOM's intact.   Ears:  Normal TM's and external ear canals, both ears   Nose: Nares normal, septum midline, mucosa normal, no drainage or sinus tenderness   Throat: Lips, mucosa, and tongue normal; teeth and gums normal   Neck: Supple, symmetrical, trachea midline, no adenopathy;   thyroid: No enlargement/tenderness/nodules; no carotid  bruit   Back:  Symmetric, no curvature, ROM normal, no CVA tenderness   Lungs:  Clear to auscultation bilaterally, respirations unlabored   Chest wall:  No tenderness or deformity   Heart:  Regular rate and rhythm, S1 and S2 normal, no murmur, rub or gallop   Abdomen:  Soft, non-tender, bowel sounds active all four quadrants,   no masses, no organomegaly   Rectal:        Extremities: Extremities normal, atraumatic, no cyanosis or edema   Pulses: 2+ and symmetric all extremities   Skin: Skin color, texture, turgor normal, no rashes or lesions   Lymph nodes: Cervical, supraclavicular, and axillary nodes normal   Neurologic: CNII-XII intact. Normal strength, sensation.          Results for orders placed or performed in visit on 05/19/23   Lipid Panel    Specimen: Blood   Result Value Ref Range    Total Cholesterol 142 100 - 199 mg/dL    Triglycerides 102 0 - 149 mg/dL    HDL Cholesterol 33 (L) >39 mg/dL    VLDL Cholesterol Jose 19 5 - 40 mg/dL    LDL Chol Calc (NIH) 90 0 - 99 mg/dL   Comprehensive Metabolic Panel    Specimen: Blood   Result Value Ref Range    Glucose 102 (H) 70 - 99 mg/dL    BUN 8 8 - 27 mg/dL    Creatinine 1.18 0.76 - 1.27 mg/dL    EGFR Result 69 >59 mL/min/1.73    BUN/Creatinine Ratio 7 (L) 10 - 24    Sodium 145 (H) 134 - 144 mmol/L    Potassium 3.9 3.5 - 5.2 mmol/L    Chloride 102 96 - 106 mmol/L    Total CO2 27 20 - 29 mmol/L    Calcium 9.0 8.6 - 10.2 mg/dL    Total Protein 6.8 6.0 - 8.5 g/dL    Albumin 3.6 (L) 3.8 - 4.8 g/dL    Globulin 3.2 1.5 - 4.5 g/dL    A/G Ratio 1.1 (L) 1.2 - 2.2    Total Bilirubin 0.3 0.0 - 1.2 mg/dL     Alkaline Phosphatase 80 44 - 121 IU/L    AST (SGOT) 19 0 - 40 IU/L    ALT (SGPT) 11 0 - 44 IU/L   CBC (No Diff)    Specimen: Blood   Result Value Ref Range    WBC 8.6 3.4 - 10.8 x10E3/uL    RBC 4.46 4.14 - 5.80 x10E6/uL    Hemoglobin 12.1 (L) 13.0 - 17.7 g/dL    Hematocrit 38.6 37.5 - 51.0 %    MCV 87 79 - 97 fL    MCH 27.1 26.6 - 33.0 pg    MCHC 31.3 (L) 31.5 - 35.7 g/dL    RDW 12.3 11.6 - 15.4 %    Platelets 434 150 - 450 x10E3/uL   TSH    Specimen: Blood   Result Value Ref Range    TSH 3.010 0.450 - 4.500 uIU/mL   T4, free    Specimen: Blood   Result Value Ref Range    Free T4 0.88 0.82 - 1.77 ng/dL   T3, free    Specimen: Blood   Result Value Ref Range    T3, Free 2.4 2.0 - 4.4 pg/mL   PSA Screen    Specimen: Blood   Result Value Ref Range    PSA 1.5 0.0 - 4.0 ng/mL   Urinalysis With Microscopic If Indicated (No Culture) - Urine, Clean Catch    Specimen: Urine, Clean Catch   Result Value Ref Range    Specific Gravity, UA CANCELED     pH, UA CANCELED     Protein CANCELED     Glucose, UA CANCELED     Ketones CANCELED    Specimen Status Report   Result Value Ref Range    Specimen Status CANCELED      Assessment & Plan   Healthy male exam.  Diagnoses and all orders for this visit:    1. Routine adult health maintenance (Primary)    2. Screening for prostate cancer    3. Ulcerative pancolitis without complication  -     Budesonide (ENTOCORT EC) 3 MG 24 hr capsule; Take 2 capsules by mouth Every Morning. Indications: UC  Dispense: 30 capsule; Refill: 0      1.  We will start Entocort today  Reviewed labs  Appears to have some nutritional deficiencies.  Start multivitamin Gummies daily  Blood pressure is low.  Stop Bystolic today  Come back in 1 month  2. Patient Counseling:  --Nutrition: Stressed importance of moderation in: sodium, caffeine, , saturated fat and cholesterol.  Discussed: caloric balance, sufficient intake of fresh fruits, vegetables, fiber, calcium, iron.  --Exercise: Stressed the importance of regular  exercise.   --Substance Abuse: Discussed cessation and or reduction of tobacco, alcohol, or other drug use; driving or other dangerous activities under the influence.    --Dental health: Discussed importance of regular tooth brushing, flossing, and dental visits.  --Suggested having eyes and vision checked if needed or past due.  --Immunizations reviewed and given if needed.  --Discussed benefits of screening colonoscopy and or ColoGuard.  3. Discussed the patient's BMI with him.  The BMI is in the acceptable range  4. Follow up in 1 month    There are no Patient Instructions on file for this visit.    Medications Discontinued During This Encounter   Medication Reason   • levothyroxine (Synthroid) 25 MCG tablet *Therapy completed   • balsalazide (COLAZAL) 750 MG capsule Side effects   • Bystolic 5 MG tablet         Dr. Reid Davenport MD  [place], [place]  [facility]

## 2023-07-07 PROBLEM — K21.9 GASTRO-ESOPHAGEAL REFLUX DISEASE WITHOUT ESOPHAGITIS: Status: RESOLVED | Noted: 2023-05-26 | Resolved: 2023-07-07

## 2023-12-04 DIAGNOSIS — I10 ESSENTIAL HYPERTENSION: ICD-10-CM

## 2023-12-04 DIAGNOSIS — I10 ESSENTIAL HYPERTENSION: Primary | ICD-10-CM

## 2023-12-04 RX ORDER — NEBIVOLOL HYDROCHLORIDE 5 MG/1
5 TABLET ORAL DAILY
Qty: 30 TABLET | Refills: 0 | Status: SHIPPED | OUTPATIENT
Start: 2023-12-04 | End: 2023-12-04 | Stop reason: SDUPTHER

## 2023-12-04 RX ORDER — NEBIVOLOL 5 MG/1
5 TABLET ORAL DAILY
Qty: 90 TABLET | OUTPATIENT
Start: 2023-12-04

## 2023-12-04 RX ORDER — NEBIVOLOL 5 MG/1
5 TABLET ORAL DAILY
Qty: 30 TABLET | Refills: 0 | Status: SHIPPED | OUTPATIENT
Start: 2023-12-04 | End: 2023-12-04 | Stop reason: SDUPTHER

## 2023-12-04 RX ORDER — NEBIVOLOL HYDROCHLORIDE 5 MG/1
5 TABLET ORAL DAILY
Qty: 90 TABLET | Refills: 0 | Status: SHIPPED | OUTPATIENT
Start: 2023-12-04

## 2024-01-19 ENCOUNTER — OFFICE VISIT (OUTPATIENT)
Dept: FAMILY MEDICINE CLINIC | Facility: CLINIC | Age: 64
End: 2024-01-19
Payer: COMMERCIAL

## 2024-01-19 VITALS
TEMPERATURE: 98 F | OXYGEN SATURATION: 100 % | BODY MASS INDEX: 26.98 KG/M2 | SYSTOLIC BLOOD PRESSURE: 144 MMHG | DIASTOLIC BLOOD PRESSURE: 88 MMHG | HEIGHT: 68 IN | WEIGHT: 178 LBS | HEART RATE: 82 BPM

## 2024-01-19 DIAGNOSIS — I10 ESSENTIAL HYPERTENSION: Primary | ICD-10-CM

## 2024-01-19 DIAGNOSIS — K51.00 ULCERATIVE PANCOLITIS WITHOUT COMPLICATION: ICD-10-CM

## 2024-01-19 PROBLEM — Z92.89 H/O OF BIOLOGICAL THERAPY TREATMENT: Status: ACTIVE | Noted: 2024-01-19

## 2024-01-19 PROCEDURE — 99213 OFFICE O/P EST LOW 20 MIN: CPT | Performed by: FAMILY MEDICINE

## 2024-01-19 RX ORDER — ADALIMUMAB 40MG/0.4ML
40 KIT SUBCUTANEOUS
COMMUNITY
Start: 2023-12-20

## 2024-01-19 NOTE — PROGRESS NOTES
Chief Complaint   Patient presents with    Hypertension     Answers submitted by the patient for this visit:  Primary Reason for Visit (Submitted on 1/17/2024)  What is the primary reason for your visit?: High Blood Pressure  High Blood Pressure Questionnaire (Submitted on 1/17/2024)  Chief Complaint: Hypertension  Onset: more than 1 year ago  Progression since onset: stable  anxiety: No  blurred vision: No  chest pain: No  headaches: No  malaise/fatigue: No  orthopnea: No  palpitations: No  peripheral edema: No  shortness of breath: No  Agents associated with hypertension: no associated agents  Compliance problems: diet  Additional Information: Weight Loss    Subjective     Patient here for follow-up of elevated blood pressure.    He is exercising and is adherent to a low-salt diet.    Blood pressure is not well controlled at home.   Cardiac symptoms: none.   Patient denies: chest pain.   Cardiovascular risk factors: advanced age (older than 55 for men, 65 for women), hypertension, and male gender.   Use of agents associated with hypertension: none.   History of target organ damage: none.  Patient is taking prescribed hypertension medications as prescribed without side effects.  Had blood pressure issues in the past.  Then was diagnosed with ulcerative colitis and had significant weight loss.  During this time he had some hypotension.  Now that he is on treatment.  He has regained his weight and blood pressure has returned  5 mg of Bystolic     he is having joint pain in his knuckles.  Also having rash.  He was concerned it may be a side effect of the Humira.  That may be a possibility, but more likely it is extraintestinal manifestations of his inflammatory bowel disease  Discussed that arthritis and rashes are common with ulcerative colitis      The following portions of the patient's history were reviewed and updated as appropriate: allergies, current medications, past family history, past medical history,  "past social history, past surgical history, and problem list.    Review of Systems  Pertinent items are noted in HPI.         Vitals:    01/19/24 1502 01/19/24 1521   BP: 124/76 144/88   BP Location: Left arm Left arm   Patient Position: Sitting    Cuff Size: Adult    Pulse: 82    Temp: 98 °F (36.7 °C)    SpO2: 100%    Weight: 80.7 kg (178 lb)    Height: 172.7 cm (68\")      BP Readings from Last 3 Encounters:   01/19/24 144/88   07/21/23 140/80   07/07/23 128/70     Objective    BMI is >= 25 and <30. (Overweight) The following options were offered after discussion;: exercise counseling/recommendations and nutrition counseling/recommendations       Gen: alert, pleasant.  Neck: no bruit, no enlarged thyroid  Lungs: CTA  Heart: RR, no murmur stay on 5 mg daily Bystolic  Feet: no edema  Pulses: intact  Nonspecific rash.  Can not call it  erythema nodosum  Knuckles also not red nor swollen      Assessment & Plan   Hypertension, stage 1 Evidence of target organ damage: none.    Diagnoses and all orders for this visit:    1. Essential hypertension (Primary)    2. Ulcerative pancolitis without complication    Some discussed with his GI specialist restarting the Humira    Okay to use topical Voltaren gel for his knuckles    Medication: continue bystolic 5.  Follow up: 6 months and as needed.    There are no Patient Instructions on file for this visit.  Medications Discontinued During This Encounter   Medication Reason    Humira Pen-CD/UC/HS Starter 80 MG/0.8ML injection Discontinued by another clinician    hydrocortisone 2.5 % ointment *Therapy completed    mupirocin (BACTROBAN) 2 % cream *Therapy completed    predniSONE (DELTASONE) 5 MG tablet Discontinued by another clinician        Return in about 6 months (around 7/19/2024) for Annual physical.    Limit salt  Limit alcoholic drinks to 1 a day  Limit caffeine to 1-2 servings a day    Dr. Reid Davenport MD  [place], [place]  [facility].  "

## 2024-04-10 DIAGNOSIS — I10 ESSENTIAL HYPERTENSION: ICD-10-CM

## 2024-04-11 RX ORDER — NEBIVOLOL HYDROCHLORIDE 5 MG/1
5 TABLET ORAL DAILY
Qty: 90 TABLET | Refills: 0 | Status: SHIPPED | OUTPATIENT
Start: 2024-04-11

## 2024-04-16 DIAGNOSIS — I10 ESSENTIAL HYPERTENSION: ICD-10-CM

## 2024-04-16 RX ORDER — NEBIVOLOL HYDROCHLORIDE 5 MG/1
5 TABLET ORAL DAILY
Qty: 90 TABLET | Refills: 0 | Status: SHIPPED | OUTPATIENT
Start: 2024-04-16

## 2024-05-09 DIAGNOSIS — Z12.5 SCREENING FOR PROSTATE CANCER: ICD-10-CM

## 2024-05-09 DIAGNOSIS — E03.9 ACQUIRED HYPOTHYROIDISM: Primary | ICD-10-CM

## 2024-05-09 DIAGNOSIS — I10 ESSENTIAL HYPERTENSION: ICD-10-CM

## 2024-05-24 ENCOUNTER — OFFICE VISIT (OUTPATIENT)
Dept: FAMILY MEDICINE CLINIC | Facility: CLINIC | Age: 64
End: 2024-05-24
Payer: COMMERCIAL

## 2024-05-24 VITALS
HEART RATE: 68 BPM | TEMPERATURE: 97.1 F | BODY MASS INDEX: 27.02 KG/M2 | HEIGHT: 68 IN | DIASTOLIC BLOOD PRESSURE: 90 MMHG | OXYGEN SATURATION: 95 % | WEIGHT: 178.3 LBS | SYSTOLIC BLOOD PRESSURE: 140 MMHG

## 2024-05-24 DIAGNOSIS — Z12.5 SCREENING FOR PROSTATE CANCER: ICD-10-CM

## 2024-05-24 DIAGNOSIS — I10 ESSENTIAL HYPERTENSION: ICD-10-CM

## 2024-05-24 DIAGNOSIS — Z00.00 ROUTINE ADULT HEALTH MAINTENANCE: Primary | ICD-10-CM

## 2024-05-24 DIAGNOSIS — K51.00 ULCERATIVE PANCOLITIS WITHOUT COMPLICATION: ICD-10-CM

## 2024-05-24 PROCEDURE — 99396 PREV VISIT EST AGE 40-64: CPT | Performed by: FAMILY MEDICINE

## 2024-05-24 RX ORDER — NEBIVOLOL HYDROCHLORIDE 5 MG/1
5 TABLET ORAL DAILY
Qty: 90 TABLET | Refills: 3 | Status: SHIPPED | OUTPATIENT
Start: 2024-05-24

## 2024-05-24 RX ORDER — UPADACITINIB 15 MG/1
15 TABLET, EXTENDED RELEASE ORAL DAILY
COMMUNITY
Start: 2024-02-19

## 2024-05-24 NOTE — PROGRESS NOTES
"  Chief Complaint   Patient presents with    Annual Exam       Subjective   Tim Ni is a 64 y.o. male and is here for a yearly physical exam. The patient reports no problems.    Do you take any herbs or supplements that were not prescribed by a doctor? yes. If so, these will be added to active medication list.    The following portions of the patient's history were reviewed and updated as appropriate: allergies, current medications, past family history, past medical history, past social history, past surgical history, and problem list.    Social and Family and Surgical History reviewed and updated today, see Rooming tab.    Health History, Preventive Measures and Vaccination flow sheets reviewed and updated today.    Patient's current medical chart in Epic; including previous office notes, Mychart messages, recent phone calls, imaging, labs, specialist's evaluation either in notes or in Media tab reviewed today.    Other outside pertinent medical information also reviewed thru Care Everywhere function is also reviewed today.    Review of Systems  Review of Systems  Pertinent items are noted in HPI.    Vitals:    05/24/24 0902   BP: 140/90   BP Location: Left arm   Patient Position: Sitting   Cuff Size: Adult   Pulse: 68   Temp: 97.1 °F (36.2 °C)   SpO2: 95%   Weight: 80.9 kg (178 lb 4.8 oz)   Height: 172.7 cm (67.99\")     Body mass index is 27.12 kg/m².             General Appearance:  Alert, cooperative, no distress, appears stated age   Head:  Normocephalic, without obvious abnormality, atraumatic   Eyes:  PERRL, conjunctiva/corneas clear, EOM's intact.   Ears:  Normal TM's and external ear canals, both ears   Nose: Nares normal, septum midline, mucosa normal, no drainage or sinus tenderness   Throat: Lips, mucosa, and tongue normal; teeth and gums normal   Neck: Supple, symmetrical, trachea midline, no adenopathy;   thyroid: No enlargement/tenderness/nodules; no carotid  bruit   Back:  Symmetric, no " curvature, ROM normal, no CVA tenderness   Lungs:  Clear to auscultation bilaterally, respirations unlabored   Chest wall:  No tenderness or deformity   Heart:  Regular rate and rhythm, S1 and S2 normal, no murmur, rub or gallop   Abdomen:  Soft, non-tender, bowel sounds active all four quadrants,   no masses, no organomegaly   Rectal:        Extremities: Extremities normal, atraumatic, no cyanosis or edema   Pulses: 2+ and symmetric all extremities   Skin: Skin color, texture, turgor normal, no rashes or lesions   Lymph nodes: Cervical, supraclavicular, and axillary nodes normal   Neurologic: CNII-XII intact. Normal strength, sensation.          Results for orders placed or performed in visit on 05/17/24   Lipid Panel    Specimen: Blood   Result Value Ref Range    Total Cholesterol 173 0 - 200 mg/dL    Triglycerides 91 0 - 150 mg/dL    HDL Cholesterol 52 40 - 60 mg/dL    VLDL Cholesterol Jose 17 5 - 40 mg/dL    LDL Chol Calc (NIH) 104 (H) 0 - 100 mg/dL   Comprehensive Metabolic Panel    Specimen: Blood   Result Value Ref Range    Glucose 93 65 - 99 mg/dL    BUN 18 8 - 23 mg/dL    Creatinine 1.19 0.76 - 1.27 mg/dL    EGFR Result 68.2 >60.0 mL/min/1.73    BUN/Creatinine Ratio 15.1 7.0 - 25.0    Sodium 140 136 - 145 mmol/L    Potassium 4.3 3.5 - 5.2 mmol/L    Chloride 104 98 - 107 mmol/L    Total CO2 27.2 22.0 - 29.0 mmol/L    Calcium 8.9 8.6 - 10.5 mg/dL    Total Protein 6.9 6.0 - 8.5 g/dL    Albumin 4.4 3.5 - 5.2 g/dL    Globulin 2.5 gm/dL    A/G Ratio 1.8 g/dL    Total Bilirubin 0.7 0.0 - 1.2 mg/dL    Alkaline Phosphatase 71 39 - 117 U/L    AST (SGOT) 32 1 - 40 U/L    ALT (SGPT) 31 1 - 41 U/L   CBC (No Diff)    Specimen: Blood   Result Value Ref Range    WBC 7.38 3.40 - 10.80 10*3/mm3    RBC 4.31 4.14 - 5.80 10*6/mm3    Hemoglobin 13.7 13.0 - 17.7 g/dL    Hematocrit 39.9 37.5 - 51.0 %    MCV 92.6 79.0 - 97.0 fL    MCH 31.8 26.6 - 33.0 pg    MCHC 34.3 31.5 - 35.7 g/dL    RDW 14.4 12.3 - 15.4 %    Platelets 211 140 -  450 10*3/mm3   PSA Screen    Specimen: Blood   Result Value Ref Range    PSA 1.210 0.000 - 4.000 ng/mL   TSH    Specimen: Blood   Result Value Ref Range    TSH 3.210 0.270 - 4.200 uIU/mL   T4    Specimen: Blood   Result Value Ref Range    T4, Total 4.42 (L) 4.50 - 11.70 mcg/dL   T3    Specimen: Blood   Result Value Ref Range    T3, Total 96.4 80.0 - 200.0 ng/dl   Urinalysis With Microscopic If Indicated (No Culture) - Urine, Clean Catch    Specimen: Urine, Clean Catch   Result Value Ref Range    Specific Gravity, UA 1.024 1.005 - 1.030    pH, UA 5.5 5.0 - 8.0    Color, UA Yellow     Appearance, UA Clear Clear    Leukocytes, UA Negative Negative    Protein Negative Negative    Glucose, UA Negative Negative    Ketones Negative Negative    Blood, UA Negative Negative    Bilirubin, UA Negative Negative    Urobilinogen, UA Comment     Nitrite, UA Negative Negative     Assessment & Plan   Healthy male exam.  Diagnoses and all orders for this visit:    1. Routine adult health maintenance (Primary)  -     CBC (No Diff); Future  -     Comprehensive Metabolic Panel; Future  -     Lipid Panel; Future  -     TSH Rfx On Abnormal To Free T4; Future  -     Urinalysis With Microscopic If Indicated (No Culture) - Urine, Clean Catch; Future    2. Screening for prostate cancer  -     PSA Screen; Future    3. Essential hypertension  -     Bystolic 5 MG tablet; Take 1 tablet by mouth Daily. Indications: High Blood Pressure Disorder  Dispense: 90 tablet; Refill: 3    4. Ulcerative pancolitis without complication  -     CBC (No Diff); Future  -     Comprehensive Metabolic Panel; Future  -     Lipid Panel; Future  -     TSH Rfx On Abnormal To Free T4; Future  -     Urinalysis With Microscopic If Indicated (No Culture) - Urine, Clean Catch; Future      1. Labs all ok  His ulcerative colitis is quiet.  No longer on Humira  Now on Rinvoq.  He is excited his son is getting  next weekend    2. Patient Counseling:  --Nutrition: Stressed  importance of moderation in: sodium, caffeine, , saturated fat and cholesterol.  Discussed: caloric balance, sufficient intake of fresh fruits, vegetables, fiber, calcium, iron.  --Exercise: Stressed the importance of regular exercise.   --Substance Abuse: Discussed cessation and or reduction of tobacco, alcohol, or other drug use; driving or other dangerous activities under the influence.    --Dental health: Discussed importance of regular tooth brushing, flossing, and dental visits.  --Suggested having eyes and vision checked if needed or past due.  --Immunizations reviewed and given if needed.  --Discussed benefits of screening colonoscopy and or ColoGuard.  3. Discussed the patient's BMI with him.  The BMI is above average; BMI management plan is completed  4. Follow up in one year    There are no Patient Instructions on file for this visit.    Medications Discontinued During This Encounter   Medication Reason    Humira Pen 40 MG/0.4ML Pen-injector Kit Discontinued by another clinician    Bystolic 5 MG tablet Reorder        Dr. Reid Davenport MD  [place], [place]  [facility]

## 2024-12-22 ENCOUNTER — PATIENT MESSAGE (OUTPATIENT)
Dept: FAMILY MEDICINE CLINIC | Facility: CLINIC | Age: 64
End: 2024-12-22
Payer: COMMERCIAL

## 2025-02-17 ENCOUNTER — OFFICE VISIT (OUTPATIENT)
Dept: FAMILY MEDICINE CLINIC | Facility: CLINIC | Age: 65
End: 2025-02-17
Payer: COMMERCIAL

## 2025-02-17 VITALS
HEIGHT: 68 IN | SYSTOLIC BLOOD PRESSURE: 118 MMHG | WEIGHT: 174 LBS | TEMPERATURE: 97.9 F | OXYGEN SATURATION: 97 % | DIASTOLIC BLOOD PRESSURE: 78 MMHG | BODY MASS INDEX: 26.37 KG/M2 | HEART RATE: 83 BPM

## 2025-02-17 DIAGNOSIS — J22 CHEST COLD: Primary | ICD-10-CM

## 2025-02-17 PROCEDURE — 99213 OFFICE O/P EST LOW 20 MIN: CPT | Performed by: FAMILY MEDICINE

## 2025-02-17 RX ORDER — PREDNISONE 10 MG/1
10 TABLET ORAL DAILY
Qty: 14 TABLET | Refills: 0 | Status: SHIPPED | OUTPATIENT
Start: 2025-02-17

## 2025-02-17 RX ORDER — AMOXICILLIN 500 MG/1
500 TABLET, FILM COATED ORAL 3 TIMES DAILY
Qty: 21 TABLET | Refills: 0 | Status: SHIPPED | OUTPATIENT
Start: 2025-02-17 | End: 2025-02-24

## 2025-02-17 NOTE — PROGRESS NOTES
"  Chief Complaint   Patient presents with    Cough    Fever     Since 12/20/24    Dry Mouth     Upper Respiratory Infection: Patient complains of follow up on a URI. Symptoms include congestion and cough. Onset of symptoms was 2 months ago, unchanged since that time. .  He is drinking plenty of fluids. Evaluation to date: none. Treatment to date: cough suppressants.  Ill contacts at home or work discussed.  General URI symptoms went away few weeks ago but consistently has a mid chest cough.  Also a dry mouth  Vitals:    02/17/25 1026   BP: 118/78   BP Location: Left arm   Patient Position: Sitting   Cuff Size: Adult   Pulse: 83   Temp: 97.9 °F (36.6 °C)   SpO2: 97%   Weight: 78.9 kg (174 lb)   Height: 172.7 cm (67.99\")     Gen: mildly ill appearing, alert  Ears: Tm's  without redness  Nose:  Congestion  Throat:  Red without exudate, some drainage, tonsils okay  Neck: no LAD  Lung:  good air movement, regular RR  Heart: RR without murmur  Skin: no rash.    Assessment & Plan   Diagnoses and all orders for this visit:    1. Chest cold (Primary)  -     amoxicillin (AMOXIL) 500 MG tablet; Take 1 tablet by mouth 3 (Three) Times a Day for 7 days.  Dispense: 21 tablet; Refill: 0  -     predniSONE (DELTASONE) 10 MG tablet; Take 1 tablet by mouth Daily.  Dispense: 14 tablet; Refill: 0         There are no Patient Instructions on file for this visit.  Tylenol or Advil as needed for pain, fever, muscle aches  Plenty of fluids  Hand washing discussed    Warm tea for throat.  Pros and cons of antibiotic use discussed    Dr. Reid Davenport MD  [place], [place]  [facility]  Answers submitted by the patient for this visit:  Cough Questionnaire (Submitted on 2/17/2025)  Chief Complaint: Cough  Chronicity: chronic  Onset: more than 1 month ago  Progression since onset: worsening  Frequency: hourly  Cough characteristics: productive of clear sputum  chest pain: No  chills: No  ear congestion: No  ear " pain: No  fever: No  headaches: No  heartburn: No  hemoptysis: No  myalgias: No  nasal congestion: No  postnasal drip: No  rash: No  rhinorrhea: No  shortness of breath: No  sore throat: No  sweats: No  weight loss: Yes  wheezing: No  Aggravated by: nothing, cold air, dust, exercise  Risk factors for lung disease: animal exposure, occupational exposure, travel

## 2025-05-24 ENCOUNTER — PATIENT MESSAGE (OUTPATIENT)
Dept: FAMILY MEDICINE CLINIC | Facility: CLINIC | Age: 65
End: 2025-05-24
Payer: COMMERCIAL

## 2025-06-18 ENCOUNTER — OFFICE VISIT (OUTPATIENT)
Dept: FAMILY MEDICINE CLINIC | Facility: CLINIC | Age: 65
End: 2025-06-18
Payer: COMMERCIAL

## 2025-06-18 VITALS
WEIGHT: 172 LBS | DIASTOLIC BLOOD PRESSURE: 76 MMHG | HEIGHT: 68 IN | OXYGEN SATURATION: 97 % | BODY MASS INDEX: 26.07 KG/M2 | HEART RATE: 78 BPM | SYSTOLIC BLOOD PRESSURE: 120 MMHG | TEMPERATURE: 98 F

## 2025-06-18 DIAGNOSIS — I10 ESSENTIAL HYPERTENSION: ICD-10-CM

## 2025-06-18 DIAGNOSIS — Z23 NEED FOR VACCINATION FOR PNEUMOCOCCUS: ICD-10-CM

## 2025-06-18 DIAGNOSIS — Z00.00 ROUTINE ADULT HEALTH MAINTENANCE: Primary | ICD-10-CM

## 2025-06-18 DIAGNOSIS — E03.9 ACQUIRED HYPOTHYROIDISM: ICD-10-CM

## 2025-06-18 RX ORDER — NEBIVOLOL HYDROCHLORIDE 5 MG/1
5 TABLET ORAL DAILY
Qty: 90 TABLET | Refills: 3 | Status: SHIPPED | OUTPATIENT
Start: 2025-06-18

## 2025-06-18 NOTE — PROGRESS NOTES
"  Chief Complaint   Patient presents with    Annual Exam       Subjective   Tim Ni is a 65 y.o. male and is here for a yearly physical exam. The patient reports problems - currently off all medications for his ulcerative colitis.  He has been generally intolerant to several of the treatments.  He is current with  of [facility].    Do you take any herbs or supplements that were not prescribed by a doctor? no. If so, these will be added to active medication list.    The following portions of the patient's history were reviewed and updated as appropriate: allergies, current medications, past family history, past medical history, past social history, past surgical history, and problem list.    Social and Family and Surgical History reviewed and updated today.    Health and Surgical History, Preventive Measures and Vaccination flow sheets reviewed and updated today.    Patient's current medical chart in Epic; including previous office notes, Mychart messages, recent phone calls, imaging, labs, specialist's evaluation either in notes or in Media tab reviewed today.    Other outside pertinent medical information also reviewed thru Care Everywhere function is also reviewed today.    Review of Systems  Review of Systems  Pertinent items are noted in HPI.    Vitals:    06/18/25 1428   BP: 120/76   BP Location: Left arm   Patient Position: Sitting   Cuff Size: Adult   Pulse: 78   Temp: 98 °F (36.7 °C)   SpO2: 97%   Weight: 78 kg (172 lb)   Height: 172.7 cm (67.99\")     Body mass index is 26.16 kg/m².          General Appearance:  Alert, cooperative, no distress, appears stated age   Head:  Normocephalic, without obvious abnormality, atraumatic   Eyes:  PERRL, conjunctiva/corneas clear, EOM's intact.   Ears:  Normal TM's and external ear canals, both ears   Nose: Nares normal, septum midline, mucosa normal, no drainage or sinus tenderness   Throat: Lips, mucosa, and tongue normal; teeth and gums viewed   Neck: " Supple, symmetrical,  no adenopathy;   thyroid: No enlargement/tenderness/nodules;   no carotid bruit   Back:  Symmetric, no curvature, ROM normal, no CVA tenderness   Lungs:  Clear to auscultation bilaterally, respirations unlabored   Chest wall:  No tenderness or deformity   Heart:  Regular rate and rhythm, S1 and S2 normal, no murmur.   Abdomen:  Soft, non-tender, bowel sounds active all four quadrants,   no masses, no organomegaly   Rectal:        Extremities: Extremities normal, atraumatic, no cyanosis or edema   Pulses: 2+ and symmetric all extremities   Skin: Skin color, texture, turgor normal, no rashes. No suspicious lesions   Lymph nodes: Cervical, supraclavicular, and axillary nodes normal   Neurologic: CNII-XII intact. Normal strength, sensation.          Results for orders placed or performed in visit on 05/01/25   CBC (No Diff)    Collection Time: 05/23/25  9:11 AM    Specimen: Blood   Result Value Ref Range    WBC 5.8 3.4 - 10.8 x10E3/uL    RBC 4.59 4.14 - 5.80 x10E6/uL    Hemoglobin 13.8 13.0 - 17.7 g/dL    Hematocrit 42.8 37.5 - 51.0 %    MCV 93 79 - 97 fL    MCH 30.1 26.6 - 33.0 pg    MCHC 32.2 31.5 - 35.7 g/dL    RDW 12.9 11.6 - 15.4 %    Platelets 228 150 - 450 x10E3/uL   Comprehensive Metabolic Panel    Collection Time: 05/23/25  9:11 AM    Specimen: Blood   Result Value Ref Range    Glucose 101 (H) 70 - 99 mg/dL    BUN 14 8 - 27 mg/dL    Creatinine 1.17 0.76 - 1.27 mg/dL    EGFR Result 69 >59 mL/min/1.73    BUN/Creatinine Ratio 12 10 - 24    Sodium 138 134 - 144 mmol/L    Potassium 4.3 3.5 - 5.2 mmol/L    Chloride 99 96 - 106 mmol/L    Total CO2 26 20 - 29 mmol/L    Calcium 9.0 8.6 - 10.2 mg/dL    Total Protein 7.1 6.0 - 8.5 g/dL    Albumin 4.1 3.9 - 4.9 g/dL    Globulin 3.0 1.5 - 4.5 g/dL    Total Bilirubin 0.6 0.0 - 1.2 mg/dL    Alkaline Phosphatase 104 44 - 121 IU/L    AST (SGOT) 23 0 - 40 IU/L    ALT (SGPT) 20 0 - 44 IU/L   Lipid Panel    Collection Time: 05/23/25  9:11 AM    Specimen:  Blood   Result Value Ref Range    Total Cholesterol 177 100 - 199 mg/dL    Triglycerides 113 0 - 149 mg/dL    HDL Cholesterol 36 (L) >39 mg/dL    VLDL Cholesterol Jose 21 5 - 40 mg/dL    LDL Chol Calc (NIH) 120 (H) 0 - 99 mg/dL   PSA Screen    Collection Time: 05/23/25  9:11 AM    Specimen: Blood   Result Value Ref Range    PSA 2.1 0.0 - 4.0 ng/mL   TSH Rfx On Abnormal To Free T4    Collection Time: 05/23/25  9:11 AM    Specimen: Blood   Result Value Ref Range    TSH 18.300 (H) 0.450 - 4.500 uIU/mL   Urinalysis With Microscopic If Indicated (No Culture) - Urine, Clean Catch    Collection Time: 05/23/25  9:11 AM    Specimen: Urine, Clean Catch   Result Value Ref Range    Specific Gravity, UA 1.021 1.005 - 1.030    pH, UA 5.5 5.0 - 7.5    Color, UA Yellow Yellow    Appearance, UA Clear Clear    Leukocytes, UA Negative Negative    Protein Negative Negative/Trace    Glucose, UA Negative Negative    Ketones Negative Negative    Blood, UA Negative Negative    Bilirubin, UA Negative Negative    Urobilinogen, UA 0.2 0.2 - 1.0 mg/dL    Nitrite, UA Negative Negative    Microscopic Examination Comment    T4F    Collection Time: 05/23/25  9:11 AM   Result Value Ref Range    Free T4 0.59 (L) 0.82 - 1.77 ng/dL     Assessment & Plan   Healthy male exam.  Diagnoses and all orders for this visit:    1. Routine adult health maintenance (Primary)    2. Need for vaccination for pneumococcus  -     Pneumococcal Conjugate Vaccine 20-Valent All    3. Essential hypertension  -     Bystolic 5 MG tablet; Take 1 tablet by mouth Daily. Indications: High Blood Pressure  Dispense: 90 tablet; Refill: 3    4. Acquired hypothyroidism  -     Lipid Panel; Future  -     TSH Rfx On Abnormal To Free T4; Future  -     T3; Future  -     T4, Free; Future      1. .  Will start treatment for hypothyroidism  2. Patient Counseling:  --Nutrition: Stressed importance of moderation in: sodium, caffeine, , saturated fat and cholesterol.  Discussed: caloric balance,  sufficient intake of fresh fruits, vegetables, fiber, calcium, iron.  --Exercise: Stressed the importance of regular exercise.   --Substance Abuse: Discussed cessation and or reduction of tobacco, alcohol, or other drug use; driving or other dangerous activities under the influence.    --Dental health: Discussed importance of regular tooth brushing, flossing, and dental visits.  --Suggested having eyes and vision checked if needed or past due.  --Immunizations reviewed and given if needed.  --Discussed benefits of screening colonoscopy   3. Discussed the patient's BMI with him.  The BMI is in the acceptable range  4. Follow up in one year    There are no Patient Instructions on file for this visit.    Medications Discontinued During This Encounter   Medication Reason    predniSONE (DELTASONE) 10 MG tablet *Therapy completed    Bystolic 5 MG tablet Reorder        Dr. Reid Davenport MD  [place], [place]  [facility]